# Patient Record
Sex: MALE | HISPANIC OR LATINO
[De-identification: names, ages, dates, MRNs, and addresses within clinical notes are randomized per-mention and may not be internally consistent; named-entity substitution may affect disease eponyms.]

---

## 2022-01-21 ENCOUNTER — APPOINTMENT (OUTPATIENT)
Dept: NEUROSURGERY | Facility: CLINIC | Age: 52
End: 2022-01-21
Payer: COMMERCIAL

## 2022-01-21 DIAGNOSIS — Z87.39 PERSONAL HISTORY OF OTHER DISEASES OF THE MUSCULOSKELETAL SYSTEM AND CONNECTIVE TISSUE: ICD-10-CM

## 2022-01-21 DIAGNOSIS — M54.9 DORSALGIA, UNSPECIFIED: ICD-10-CM

## 2022-01-21 DIAGNOSIS — M25.551 PAIN IN RIGHT HIP: ICD-10-CM

## 2022-01-21 PROBLEM — Z00.00 ENCOUNTER FOR PREVENTIVE HEALTH EXAMINATION: Status: ACTIVE | Noted: 2022-01-21

## 2022-01-21 PROCEDURE — 99203 OFFICE O/P NEW LOW 30 MIN: CPT

## 2022-01-21 RX ORDER — SULINDAC 200 MG/1
TABLET ORAL
Refills: 0 | Status: ACTIVE | COMMUNITY

## 2022-01-21 RX ORDER — CARISOPRODOL 250 MG/1
TABLET ORAL
Refills: 0 | Status: ACTIVE | COMMUNITY

## 2022-01-21 NOTE — CONSULT LETTER
[Consult Letter:] : I had the pleasure of evaluating your patient, [unfilled]. [Please see my note below.] : Please see my note below. [Consult Closing:] : Thank you very much for allowing me to participate in the care of this patient.  If you have any questions, please do not hesitate to contact me. [Sincerely,] : Sincerely, [FreeTextEntry3] : ZHANG Lewis

## 2022-01-21 NOTE — PHYSICAL EXAM
[General Appearance - Alert] : alert [General Appearance - In No Acute Distress] : in no acute distress [General Appearance - Well Nourished] : well nourished [General Appearance - Well Developed] : well developed [General Appearance - Well-Appearing] : healthy appearing [] : normal voice and communication [Oriented To Time, Place, And Person] : oriented to person, place, and time [Impaired Insight] : insight and judgment were intact [Affect] : the affect was normal [Person] : oriented to person [Place] : oriented to place [Time] : oriented to time [Motor Tone] : muscle tone was normal in all four extremities [Motor Strength] : muscle strength was normal in all four extremities [Involuntary Movements] : no involuntary movements were seen [No Muscle Atrophy] : normal bulk in all four extremities [Abnormal Walk] : normal gait [Balance] : balance was intact [2+] : Patella left 2+ [Right Paraspinal ___ (level)] : ~Ulevel [unfilled] right paraspinal [Incision CDI] : was clean, dry and intact [Muscle Spasms, Right] : right-sided muscle spasms [Deferred] : Deferred [No Deficits] : no sensory deficits [Pain / Temp Decrease Lower Thigh (L3) - Left Only] : L3 sensory impairment [Pain / Temp Decrease Knee And Medial Leg (L4) - Left Only] : L4 sensory impairment [5] : 5/5 Ankle Plantar Flexion (S1) [2] : 2/4 Knee Jerk Reflex [Deformity] : no deformity [Erythema] : no erythema [Ecchymosis] : no ecchymosis [Swelling] : no swelling

## 2022-01-21 NOTE — PLAN
[FreeTextEntry1] : Shashi Goodman is a 51 years old male here today for neurosurgical evaluation of mid and lower back pain. The patient is a former patient who underwent a microdiskectomy with Dr. Moses when he was an attending at [facility]. Today he presents with new symptoms of mid back pain with radiation right flank, lower right back and hip. He reports the symptoms have been presents for a couple of months but has been worsening in the last three. On examination he reports less sensation to the abdomen when checking for numbness and tingling. Since he has tried conservative therapy which has consisted of physical therapy and  use of NSAIDs for greater than 6 weeks and symptoms are presents will order a MRI of the thoracic and lumbar spine and Xray to rule out instability. All questions and concerns has been addressed and answered. Once all testing is complete patient should follow-up with Dr. Moses to review and discuss imaging. I spent a total time of 40 minutes with the patient with more than half spent on counseling and coordinating care.

## 2022-01-21 NOTE — HISTORY OF PRESENT ILLNESS
[de-identified] : 51 years old male here today for a neurosurgical evaluation of both mid and lower back pain. The patient is a former patient of Dr. Moses and he underwent a microdiectomy a couple of years ago due to left lower extremity symptoms. He reports he did well after the surgery but in the last couple of months he is now presenting with symptoms in his mid back radiating to the right side of his back to the hip. He reports the pain in the mid back radiates to right flank area and right hip to the groin. with lumbar spine. In the last 3 months the pain has been intensifying. The pain is described as intermittent aching, burning,tension, throbbing, sharp, stabbing.On today visit the pain level is mild but the pain level can be as high as 10.He denies numbness, tingling, weakness,  gait dysfunction/ and/or incontinence. Pain is aggravated by walking, weather sitting, lying, standing, exertional activities, bending, twisting. Current medications include gabapentin and NSAIDs.  For conservative management the patient has tried physical therapy which he states does not really help him. Denies any epidural injection or pain management intervention

## 2022-01-21 NOTE — REVIEW OF SYSTEMS
[Abnormal Sensation] : an abnormal sensation [Difficulty Walking] : difficulty walking [Negative] : Heme/Lymph [Leg Weakness] : no leg weakness [FreeTextEntry9] : mid back pain and lower back pain

## 2022-04-12 ENCOUNTER — APPOINTMENT (OUTPATIENT)
Dept: NEUROSURGERY | Facility: CLINIC | Age: 52
End: 2022-04-12
Payer: COMMERCIAL

## 2022-04-12 VITALS
SYSTOLIC BLOOD PRESSURE: 115 MMHG | BODY MASS INDEX: 34.53 KG/M2 | WEIGHT: 220 LBS | HEART RATE: 63 BPM | TEMPERATURE: 97.5 F | HEIGHT: 67 IN | OXYGEN SATURATION: 96 % | DIASTOLIC BLOOD PRESSURE: 72 MMHG

## 2022-04-12 DIAGNOSIS — Z98.890 OTHER SPECIFIED POSTPROCEDURAL STATES: ICD-10-CM

## 2022-04-12 DIAGNOSIS — M54.16 RADICULOPATHY, LUMBAR REGION: ICD-10-CM

## 2022-04-12 DIAGNOSIS — M51.26 OTHER INTERVERTEBRAL DISC DISPLACEMENT, LUMBAR REGION: ICD-10-CM

## 2022-04-12 DIAGNOSIS — M54.9 DORSALGIA, UNSPECIFIED: ICD-10-CM

## 2022-04-12 PROCEDURE — 99213 OFFICE O/P EST LOW 20 MIN: CPT

## 2022-04-13 NOTE — HISTORY OF PRESENT ILLNESS
[de-identified] : 04/12/22:  is a 52 y/o with hx of L5-S1 hemilaminectomy 2014, here today for f/u of chronic low back and thoracic pain. Most of his pain is on the L side of his lower back. He has radiation of pain down L leg and numbness/ tingling on RLE. Prior to his surgery he had similar symptoms however, they resolved with sx and then began approximately 2 years after his operation. He has tried conservative management with PO meds, injections, and PT. His thoracic pain is currently controlled. Denies weakness, or urinary incontinence. \par \par 51 years old male here today for a neurosurgical evaluation of both mid and lower back pain. The patient is a former patient of Dr. Moses and he underwent sx a couple of years ago due to left lower extremity symptoms. He reports he did well after the surgery but in the last couple of months he is now presenting with symptoms in his mid back radiating to the right side of his back to the hip. He reports the pain in the mid back radiates to right flank area and right hip to the groin. with lumbar spine. In the last 3 months the pain has been intensifying. The pain is described as intermittent aching, burning,tension, throbbing, sharp, stabbing.On today visit the pain level is mild but the pain level can be as high as 10.He denies numbness, tingling, weakness,  gait dysfunction/ and/or incontinence. Pain is aggravated by walking, weather sitting, lying, standing, exertional activities, bending, twisting. Current medications include gabapentin and NSAIDs.  For conservative management the patient has tried physical therapy which he states does not really help him. Denies any epidural injection or pain management intervention

## 2022-04-13 NOTE — PHYSICAL EXAM
[General Appearance - Alert] : alert [General Appearance - In No Acute Distress] : in no acute distress [General Appearance - Well Nourished] : well nourished [General Appearance - Well Developed] : well developed [General Appearance - Well-Appearing] : healthy appearing [] : normal voice and communication [Oriented To Time, Place, And Person] : oriented to person, place, and time [Affect] : the affect was normal [Impaired Insight] : insight and judgment were intact [Person] : oriented to person [Place] : oriented to place [Time] : oriented to time [Motor Tone] : muscle tone was normal in all four extremities [Motor Strength] : muscle strength was normal in all four extremities [Involuntary Movements] : no involuntary movements were seen [No Muscle Atrophy] : normal bulk in all four extremities [Abnormal Walk] : normal gait [Balance] : balance was intact [2+] : Patella left 2+ [Right Paraspinal ___ (level)] : ~Ulevel [unfilled] right paraspinal [Straight-Leg Raise Test - Left] : straight leg raise of the left leg was positive [Straight-Leg Raise Test - Right] : straight leg raise of the right leg was positive [Incision CDI] : was clean, dry and intact [Muscle Spasms, Right] : right-sided muscle spasms [Deferred] : Deferred [No Deficits] : no sensory deficits [Pain / Temp Decrease Knee And Medial Leg (L4) - Left Only] : L4 sensory impairment [Pain / Temp Decrease Lower Thigh (L3) - Left Only] : L3 sensory impairment [5] : 5/5 Ankle Plantar Flexion (S1) [2] : 2/4 Knee Jerk Reflex [Deformity] : no deformity [Erythema] : no erythema [Ecchymosis] : no ecchymosis [Swelling] : no swelling

## 2022-04-13 NOTE — REVIEW OF SYSTEMS
[As Noted in HPI] : as noted in HPI [Numbness] : numbness [Tingling] : tingling [Abnormal Sensation] : an abnormal sensation [Difficulty Walking] : difficulty walking [Negative] : Heme/Lymph [Leg Weakness] : no leg weakness [de-identified] : low back and thoracic pain  [FreeTextEntry9] : mid back pain and lower back pain

## 2022-04-13 NOTE — ASSESSMENT
[FreeTextEntry1] : Patient to continue with conservative management. Pain currently under control. Imaging reviewed with pt in detail. F/u as needed.